# Patient Record
Sex: MALE | Race: WHITE | NOT HISPANIC OR LATINO | Employment: FULL TIME | URBAN - METROPOLITAN AREA
[De-identification: names, ages, dates, MRNs, and addresses within clinical notes are randomized per-mention and may not be internally consistent; named-entity substitution may affect disease eponyms.]

---

## 2022-09-12 ENCOUNTER — OFFICE VISIT (OUTPATIENT)
Dept: INTERNAL MEDICINE | Facility: CLINIC | Age: 42
End: 2022-09-12

## 2022-09-12 VITALS
OXYGEN SATURATION: 99 % | HEART RATE: 60 BPM | HEIGHT: 70 IN | DIASTOLIC BLOOD PRESSURE: 100 MMHG | SYSTOLIC BLOOD PRESSURE: 145 MMHG | BODY MASS INDEX: 25.48 KG/M2 | WEIGHT: 178 LBS

## 2022-09-12 DIAGNOSIS — I10 HYPERTENSION, UNSPECIFIED TYPE: Primary | ICD-10-CM

## 2022-09-12 DIAGNOSIS — Z76.89 ENCOUNTER TO ESTABLISH CARE: ICD-10-CM

## 2022-09-12 DIAGNOSIS — Z87.898 HISTORY OF PREDIABETES: ICD-10-CM

## 2022-09-12 DIAGNOSIS — I49.3 PVC (PREMATURE VENTRICULAR CONTRACTION): ICD-10-CM

## 2022-09-12 PROBLEM — E78.5 HYPERLIPIDEMIA: Status: ACTIVE | Noted: 2021-01-13

## 2022-09-12 PROCEDURE — 99204 OFFICE O/P NEW MOD 45 MIN: CPT | Performed by: NURSE PRACTITIONER

## 2022-09-12 NOTE — PROGRESS NOTES
Date of Encounter: 2022  Patient: Herman Ventura,  1980    Subjective     Chief complaint: Blood pressure, establish care    HPI   Patient here today establishing care.  Patient states that he recently followed with a [facility] provider but that it was difficult to get in on regular intervals.  Patient is here today to discuss blood pressure.  Patient states that he does have a family history of high blood pressure and a few of his previous visits his blood pressure has been elevated.    Patient states that it became elevated about a year ago after he sustained a knee injury.  Patient is a avid runner and has not been as active as usual due to the injury.  Patient states he is likely gained 10 pounds in the last year since injury.    Patient has followed with cardiology due to some PVCs that were found on a routine physical exam.  Patient is a  and gets biannual physicals.  Patient states that he is always been feeling well has no symptoms and is no longer needing to follow with a cardiologist.    Review of Systems:  Negative for fever, congestion, chest pain upon exertion, shortness of breath, vision changes, vomiting, dysuria, lymphadenopathy, muscle weakness, numbness, mood changes, rashes.    The following portions of the patient's history were reviewed and updated as appropriate: allergies, current medications, past family history, past medical history, past social history, past surgical history and problem list.    Patient Active Problem List   Diagnosis   • Hyperlipidemia   • PVC (premature ventricular contraction)   • History of prediabetes   • High blood pressure     History reviewed. No pertinent past medical history.  Past Surgical History:   Procedure Laterality Date   • APPENDECTOMY       History reviewed. No pertinent family history.  No current outpatient medications on file.  No Known Allergies  Social History     Tobacco Use   • Smoking status: Never Smoker   • Smokeless tobacco:  "Never Used   Vaping Use   • Vaping Use: Every day   Substance Use Topics   • Alcohol use: Yes     Comment: Occassionaly   • Drug use: Never          Objective   Physical Exam   Vitals:    09/12/22 1058   BP: 145/100   Pulse: 60   SpO2: 99%   Weight: 80.7 kg (178 lb)   Height: 177.8 cm (70\")     Body mass index is 25.54 kg/m².    Constitutional: NAD.  Cardiovascular: RRR. No murmurs. No LE edema b/l. Radial pulses 2+ bilaterally.  Pulmonary: CTA b/l. Good effort..  Psychiatric: Normal affect. Normal thought content.           Assessment & Plan   Assessment and Plan  Pleasant 42-year-old male with hyperlipidemia, PVCs, high blood pressure, history of prediabetes here today establishing care.  The following was discussed:    Diagnoses and all orders for this visit:    1. Hypertension, unspecified type (Primary)  Assessment & Plan:  Had a long discussion with patient regarding family history of blood pressure.  Discussed about low-salt diet and regular exercise.  Discussed about red flag symptoms of hypertension.  Patient to keep blood pressure log and bring into next visit for review.  If still elevated will start patient on low-dose losartan.      2. PVC (premature ventricular contraction)  Overview:  Patient had full work-up and was cleared by cardiology in September 2021.    Assessment & Plan:  Patient asymptomatic.      3. History of prediabetes  Assessment & Plan:  Reviewed previous A1c of 5.5.  We will repeat at annual exam.      4. Encounter to establish care   Welcomed patient to practice. Discussed office policies. Reviewed patient reported medical history at bedside.    Patient verbalized understanding of and agreed to plan of care.    Return in about 2 weeks (around 9/26/2022) for Annual physical, Fasting Labs.     Kia Urias DNP, FNP-C  Family Medicine  O: [phone]      Please note that portions of this note were completed with a voice recognition program. Please call if questions.     "

## 2022-09-12 NOTE — ASSESSMENT & PLAN NOTE
Had a long discussion with patient regarding family history of blood pressure.  Discussed about low-salt diet and regular exercise.  Discussed about red flag symptoms of hypertension.  Patient to keep blood pressure log and bring into next visit for review.  If still elevated will start patient on low-dose losartan.

## 2022-10-03 ENCOUNTER — OFFICE VISIT (OUTPATIENT)
Dept: INTERNAL MEDICINE | Facility: CLINIC | Age: 42
End: 2022-10-03

## 2022-10-03 VITALS
DIASTOLIC BLOOD PRESSURE: 76 MMHG | SYSTOLIC BLOOD PRESSURE: 124 MMHG | HEIGHT: 70 IN | OXYGEN SATURATION: 98 % | BODY MASS INDEX: 24.39 KG/M2 | WEIGHT: 170.4 LBS | HEART RATE: 75 BPM | TEMPERATURE: 97.7 F

## 2022-10-03 DIAGNOSIS — E78.5 HYPERLIPIDEMIA, UNSPECIFIED HYPERLIPIDEMIA TYPE: ICD-10-CM

## 2022-10-03 DIAGNOSIS — I10 HYPERTENSION, UNSPECIFIED TYPE: ICD-10-CM

## 2022-10-03 DIAGNOSIS — Z00.00 ANNUAL PHYSICAL EXAM: Primary | ICD-10-CM

## 2022-10-03 DIAGNOSIS — Z87.898 HISTORY OF PREDIABETES: ICD-10-CM

## 2022-10-03 DIAGNOSIS — I49.3 PVC (PREMATURE VENTRICULAR CONTRACTION): ICD-10-CM

## 2022-10-03 DIAGNOSIS — Z11.59 NEED FOR HEPATITIS C SCREENING TEST: ICD-10-CM

## 2022-10-03 PROCEDURE — 99396 PREV VISIT EST AGE 40-64: CPT | Performed by: NURSE PRACTITIONER

## 2022-10-03 NOTE — PROGRESS NOTES
"Date of Encounter: 10/03/2022  Patient: Herman Ventura,  1980    Subjective     Chief complaint: Annual    HPI   Patient here today for annual physical.  Patient states that he is doing well.  Patient has been checking his blood pressure at home and brought his log in for review today.    Patient stays very active he is a runner and does long distance runs.    Patient states that most of his meals prepared at home.  He gets fresh fruit and vegetable servings daily.  Denies any sugary drinks.    Patient has yet to find a dental home since moving to the area.  Patient is not fasting today but would like to have his blood work updated.  No acute concerns.        Review of Systems:  Negative for fever, congestion, chest pain upon exertion, shortness of breath, vision changes, vomiting, dysuria, lymphadenopathy, muscle weakness, numbness, mood changes, rashes.    The following portions of the patient's history were reviewed and updated as appropriate: allergies, current medications, past family history, past medical history, past social history, past surgical history and problem list.    Patient Active Problem List   Diagnosis   • Hyperlipidemia   • PVC (premature ventricular contraction)   • History of prediabetes   • High blood pressure     History reviewed. No pertinent past medical history.  Past Surgical History:   Procedure Laterality Date   • APPENDECTOMY       History reviewed. No pertinent family history.  No current outpatient medications on file.  No Known Allergies  Social History     Tobacco Use   • Smoking status: Never Smoker   • Smokeless tobacco: Never Used   Vaping Use   • Vaping Use: Every day   Substance Use Topics   • Alcohol use: Yes     Comment: Occassionaly   • Drug use: Never          Objective   Physical Exam   Vitals:    10/03/22 0735   BP: 124/76   Pulse: 75   Temp: 97.7 °F (36.5 °C)   TempSrc: Temporal   SpO2: 98%   Weight: 77.3 kg (170 lb 6.4 oz)   Height: 177.8 cm (70\")     Body mass " index is 24.45 kg/m².    Constitutional: NAD.  Eyes: EOMI. PERRLA. Normal conjunctiva.  Ear, nose, mouth, throat: Oral deferred due to pandemic.  Normal external ear canals and TMs bilaterally.  Cardiovascular: RRR. No murmurs. No LE edema b/l. Radial pulses 2+ bilaterally.  Pulmonary: CTA b/l. Good effort.  Integumentary: No rashes or wounds on face or upper extremities.  Lymphatic: No anterior cervical lymphadenopathy.  Endocrine: No thyromegaly or palpable thyroid nodules.  Psychiatric: Normal affect. Normal thought content.           Assessment & Plan   Assessment and Plan  Pleasant 42-year-old male with hyperlipidemia, high blood pressure, history of prediabetes and PVCs here today for annual physical.  The following was discussed:    Diagnoses and all orders for this visit:    1. Annual physical exam (Primary)  -     Hemoglobin A1c; Future  -     CBC & Differential; Future  -     Comprehensive Metabolic Panel; Future  -     Lipid Panel; Future   We discussed preventative care including age and patient-appropriate immunizations and cancer screening. We also discussed the importance of exercise and a healthy diet.  Does the importance of getting established with a dentist.  This is their annual preventative exam.    2. Hyperlipidemia, unspecified hyperlipidemia type  Overview:  Familial hypercholesteremia.    Assessment & Plan:  Discussed with patient about behavioral modifications.  Answered questions from the patient.  Patient to return for fasting lab.    Orders:  -     Lipid Panel; Future    3. PVC (premature ventricular contraction)  Overview:  Patient had full work-up and was cleared by cardiology in September 2021.    Assessment & Plan:  Stable.  Patient asymptomatic.      4. Hypertension, unspecified type  Assessment & Plan:  Controlled.  Reviewed patient home blood pressure log.  Patient to continue with low-salt diet and regular exercise.    Orders:  -     CBC & Differential; Future  -      Comprehensive Metabolic Panel; Future    5. History of prediabetes  Assessment & Plan:  Discussed with patient about prevention.  We will continue to monitor.    Orders:  -     Hemoglobin A1c; Future    6. Need for hepatitis C screening test  -     HCV Antibody Rfx To Qnt PCR; Future       Patient verbalized understanding of and agreed to plan of care.    Return in about 1 year (around 10/3/2023) for Annual physical.     Kia Urias DNP, FNP-C  [facility]  O: [phone]      Please note that portions of this note were completed with a voice recognition program. Please call if questions.

## 2022-10-03 NOTE — ASSESSMENT & PLAN NOTE
Discussed with patient about behavioral modifications.  Answered questions from the patient.  Patient to return for fasting lab.

## 2022-10-03 NOTE — ASSESSMENT & PLAN NOTE
Controlled.  Reviewed patient home blood pressure log.  Patient to continue with low-salt diet and regular exercise.